# Patient Record
Sex: FEMALE | Race: WHITE | Employment: OTHER | URBAN - METROPOLITAN AREA
[De-identification: names, ages, dates, MRNs, and addresses within clinical notes are randomized per-mention and may not be internally consistent; named-entity substitution may affect disease eponyms.]

---

## 2019-06-19 ENCOUNTER — LAB ENCOUNTER (OUTPATIENT)
Dept: LAB | Facility: REFERENCE LAB | Age: 44
End: 2019-06-19
Attending: FAMILY MEDICINE
Payer: COMMERCIAL

## 2019-06-19 ENCOUNTER — OFFICE VISIT (OUTPATIENT)
Dept: FAMILY MEDICINE CLINIC | Facility: CLINIC | Age: 44
End: 2019-06-19
Payer: COMMERCIAL

## 2019-06-19 VITALS
WEIGHT: 160 LBS | SYSTOLIC BLOOD PRESSURE: 120 MMHG | HEART RATE: 73 BPM | DIASTOLIC BLOOD PRESSURE: 80 MMHG | BODY MASS INDEX: 30.21 KG/M2 | OXYGEN SATURATION: 99 % | HEIGHT: 61 IN

## 2019-06-19 DIAGNOSIS — Z00.00 ENCOUNTER FOR ROUTINE ADULT HEALTH EXAMINATION WITHOUT ABNORMAL FINDINGS: ICD-10-CM

## 2019-06-19 DIAGNOSIS — Z00.00 ENCOUNTER FOR ROUTINE ADULT HEALTH EXAMINATION WITHOUT ABNORMAL FINDINGS: Primary | ICD-10-CM

## 2019-06-19 PROCEDURE — 99386 PREV VISIT NEW AGE 40-64: CPT | Performed by: FAMILY MEDICINE

## 2019-06-19 PROCEDURE — 80053 COMPREHEN METABOLIC PANEL: CPT

## 2019-06-19 PROCEDURE — 85025 COMPLETE CBC W/AUTO DIFF WBC: CPT

## 2019-06-19 PROCEDURE — 80061 LIPID PANEL: CPT

## 2019-06-19 PROCEDURE — 83036 HEMOGLOBIN GLYCOSYLATED A1C: CPT

## 2019-06-19 PROCEDURE — 36415 COLL VENOUS BLD VENIPUNCTURE: CPT

## 2019-06-19 NOTE — PATIENT INSTRUCTIONS
Prevention Guidelines, Women Ages 36 to 52  Screening tests and vaccines are an important part of managing your health. A screening test is done to find possible disorders or diseases in people who don't have any symptoms.  The goal is to find a disease e this age group At routine exams   Gonorrhea Sexually active women at increased risk for infection At routine exams   Hepatitis C Anyone at increased risk; 1 time for those born between [facility] At routine exams   High cholesterol or triglycerides All Pneumococcal conjugate vaccine (PCV13) and pneumococcal polysaccharide vaccine (PPSV23) Women at increased risk for infection–talk with your healthcare provider 1 or 2 doses   Tetanus/diphtheria/pertussis (Td/Tdap) booster All women in this age group A one

## 2019-06-19 NOTE — PROGRESS NOTES
HPI:   José Manuel Cuevas is a 38year old female who presents for a complete physical exam.     From [facility] [place], then moved to [place], then [place], and now living in [place].      Last pap: 12/2018  Last mammogram: Never had before, plans to star SKIN: no rashes, no suspicious lesions  HEENT: atraumatic, normocephalic, throat clear; normal dentition  EYES: PERRLA, EOMI, conjunctiva are clear  NECK: supple, no adenopathy or thyroid masses   BREAST: no dominant or suspicious mass, no nipple dischar Prescriptions      No prescriptions requested or ordered in this encounter       Imaging & Consults:  None    Cristian Courtney DO  6/19/2019  1:18 PM

## 2020-11-19 ENCOUNTER — HOSPITAL ENCOUNTER (OUTPATIENT)
Age: 45
Discharge: HOME | End: 2020-11-19
Payer: COMMERCIAL

## 2020-11-19 PROCEDURE — 90471 IMMUNIZATION ADMIN: CPT | Performed by: EMERGENCY MEDICINE

## 2020-11-19 PROCEDURE — 90686 IIV4 VACC NO PRSV 0.5 ML IM: CPT | Performed by: EMERGENCY MEDICINE

## 2021-07-28 ENCOUNTER — OFFICE VISIT (OUTPATIENT)
Dept: FAMILY MEDICINE CLINIC | Facility: CLINIC | Age: 46
End: 2021-07-28
Payer: COMMERCIAL

## 2021-07-28 VITALS
WEIGHT: 171 LBS | DIASTOLIC BLOOD PRESSURE: 78 MMHG | OXYGEN SATURATION: 99 % | BODY MASS INDEX: 32.28 KG/M2 | HEIGHT: 61 IN | HEART RATE: 68 BPM | SYSTOLIC BLOOD PRESSURE: 118 MMHG

## 2021-07-28 DIAGNOSIS — Z12.4 PAP SMEAR FOR CERVICAL CANCER SCREENING: ICD-10-CM

## 2021-07-28 DIAGNOSIS — R63.5 WEIGHT GAIN: ICD-10-CM

## 2021-07-28 DIAGNOSIS — M72.2 PLANTAR FASCIITIS OF RIGHT FOOT: ICD-10-CM

## 2021-07-28 DIAGNOSIS — Z00.00 ENCOUNTER FOR ROUTINE ADULT HEALTH EXAMINATION WITHOUT ABNORMAL FINDINGS: Primary | ICD-10-CM

## 2021-07-28 PROCEDURE — 3074F SYST BP LT 130 MM HG: CPT | Performed by: FAMILY MEDICINE

## 2021-07-28 PROCEDURE — 3008F BODY MASS INDEX DOCD: CPT | Performed by: FAMILY MEDICINE

## 2021-07-28 PROCEDURE — 99396 PREV VISIT EST AGE 40-64: CPT | Performed by: FAMILY MEDICINE

## 2021-07-28 PROCEDURE — 87624 HPV HI-RISK TYP POOLED RSLT: CPT | Performed by: FAMILY MEDICINE

## 2021-07-28 PROCEDURE — 3078F DIAST BP <80 MM HG: CPT | Performed by: FAMILY MEDICINE

## 2021-07-28 RX ORDER — CHOLECALCIFEROL (VITAMIN D3) 125 MCG
2000 CAPSULE ORAL DAILY
COMMUNITY

## 2021-07-28 NOTE — PROGRESS NOTES
HPI:   Prashant Santoro is a 51year old female who presents for a complete physical exam.     Last pap: 12/2018 and normal  Last mammogram: Never had one and plans to start at age 47   Menses: Having shorter, heavier cycles but still monthly.  Also having tiffany Children: 2 sons (6 and 9)         EXAM:   Wt Readings from Last 6 Encounters:  07/28/21 : 171 lb (77.6 kg)  06/19/19 : 160 lb (72.6 kg)    Body mass index is 32.31 kg/m².    /78   Pulse 68   Ht 5' 1\" (1.549 m)   Wt 171 lb (77.6 kg)   LMP 06/30/2021 following:  -Monthly breast self-exam  -Breast cancer screening/mammograms and clinical breast exams  -Cervical cancer screening/pap smears  -Colon cancer screening/colonoscopy-declines  -Adequate calcium and Vitamin D intake to prevent osteoporosis  -Heal

## 2021-07-28 NOTE — PATIENT INSTRUCTIONS
-Look into the [place] sock or night splint on [employer] for plantar fasciitis and wear it nightly  -Also try calf stretches and shoe inserts/supportive shoes as well as ice massage   Prevention Guidelines, Women Ages 36 to 52  Screening tests and vaccines every 5 years   Colorectal cancer Women age 39 years and older at average risk Multiple tests are available and are used at different times.  Possible tests include:  · Flexible sigmoidoscopy every 5 years, or  · Colonoscopy every 10 years, or  · CT colonog Vaccine Who needs it How often   Chickenpox (varicella) All women in this age group who have no record of this infection or vaccine 2 doses; the second dose should be given at least 4 weeks after the first dose   Hepatitis A Women at increased risk for inf effects it can cause All women in this age group Every exam   1 American Diabetes Association  2 American College of Obstetricians and Gynecologists   3 [address]  [address] [facility]  [facility] last reviewed this educational c custom-fitted shoe inserts may be helpful. Inserts made of silicone seem to be the most effective.  Custom-made inserts can be provided by foot specialist, physical therapist, or orthopedist.  · Premade or custom-made night splints keep the heel stretched o

## 2021-07-29 LAB — HPV I/H RISK 1 DNA SPEC QL NAA+PROBE: NEGATIVE

## 2021-07-30 ENCOUNTER — LAB ENCOUNTER (OUTPATIENT)
Dept: LAB | Facility: REFERENCE LAB | Age: 46
End: 2021-07-30
Attending: FAMILY MEDICINE
Payer: COMMERCIAL

## 2021-07-30 DIAGNOSIS — Z00.00 ENCOUNTER FOR ROUTINE ADULT HEALTH EXAMINATION WITHOUT ABNORMAL FINDINGS: ICD-10-CM

## 2021-07-30 DIAGNOSIS — R63.5 WEIGHT GAIN: ICD-10-CM

## 2021-07-30 LAB
ALBUMIN SERPL-MCNC: 3.5 G/DL (ref 3.4–5)
ALBUMIN/GLOB SERPL: 1 {RATIO} (ref 1–2)
ALP LIVER SERPL-CCNC: 85 U/L
ALT SERPL-CCNC: 18 U/L
ANION GAP SERPL CALC-SCNC: 6 MMOL/L (ref 0–18)
AST SERPL-CCNC: 12 U/L (ref 15–37)
BASOPHILS # BLD AUTO: 0.04 X10(3) UL (ref 0–0.2)
BASOPHILS NFR BLD AUTO: 0.6 %
BILIRUB SERPL-MCNC: 0.5 MG/DL (ref 0.1–2)
BUN BLD-MCNC: 11 MG/DL (ref 7–18)
BUN/CREAT SERPL: 15.7 (ref 10–20)
CALCIUM BLD-MCNC: 8.5 MG/DL (ref 8.5–10.1)
CHLORIDE SERPL-SCNC: 110 MMOL/L (ref 98–112)
CHOLEST SMN-MCNC: 174 MG/DL (ref ?–200)
CO2 SERPL-SCNC: 23 MMOL/L (ref 21–32)
CREAT BLD-MCNC: 0.7 MG/DL
DEPRECATED RDW RBC AUTO: 42.1 FL (ref 35.1–46.3)
EOSINOPHIL # BLD AUTO: 0.16 X10(3) UL (ref 0–0.7)
EOSINOPHIL NFR BLD AUTO: 2.4 %
ERYTHROCYTE [DISTWIDTH] IN BLOOD BY AUTOMATED COUNT: 12.2 % (ref 11–15)
EST. AVERAGE GLUCOSE BLD GHB EST-MCNC: 114 MG/DL (ref 68–126)
GLOBULIN PLAS-MCNC: 3.6 G/DL (ref 2.8–4.4)
GLUCOSE BLD-MCNC: 92 MG/DL (ref 70–99)
HBA1C MFR BLD HPLC: 5.6 % (ref ?–5.7)
HCT VFR BLD AUTO: 40.8 %
HCV AB SERPL QL IA: NONREACTIVE
HDLC SERPL-MCNC: 65 MG/DL (ref 40–59)
HGB BLD-MCNC: 13.2 G/DL
IMM GRANULOCYTES # BLD AUTO: 0.02 X10(3) UL (ref 0–1)
IMM GRANULOCYTES NFR BLD: 0.3 %
LDLC SERPL CALC-MCNC: 95 MG/DL (ref ?–100)
LYMPHOCYTES # BLD AUTO: 1.53 X10(3) UL (ref 1–4)
LYMPHOCYTES NFR BLD AUTO: 23.2 %
M PROTEIN MFR SERPL ELPH: 7.1 G/DL (ref 6.4–8.2)
MCH RBC QN AUTO: 30.2 PG (ref 26–34)
MCHC RBC AUTO-ENTMCNC: 32.4 G/DL (ref 31–37)
MCV RBC AUTO: 93.4 FL
MONOCYTES # BLD AUTO: 0.58 X10(3) UL (ref 0.1–1)
MONOCYTES NFR BLD AUTO: 8.8 %
NEUTROPHILS # BLD AUTO: 4.26 X10 (3) UL (ref 1.5–7.7)
NEUTROPHILS # BLD AUTO: 4.26 X10(3) UL (ref 1.5–7.7)
NEUTROPHILS NFR BLD AUTO: 64.7 %
NONHDLC SERPL-MCNC: 109 MG/DL (ref ?–130)
OSMOLALITY SERPL CALC.SUM OF ELEC: 287 MOSM/KG (ref 275–295)
PATIENT FASTING Y/N/NP: NO
PATIENT FASTING Y/N/NP: NO
PLATELET # BLD AUTO: 201 10(3)UL (ref 150–450)
POTASSIUM SERPL-SCNC: 4.1 MMOL/L (ref 3.5–5.1)
RBC # BLD AUTO: 4.37 X10(6)UL
SODIUM SERPL-SCNC: 139 MMOL/L (ref 136–145)
TRIGL SERPL-MCNC: 72 MG/DL (ref 30–149)
TSI SER-ACNC: 0.71 MIU/ML (ref 0.36–3.74)
VLDLC SERPL CALC-MCNC: 12 MG/DL (ref 0–30)
WBC # BLD AUTO: 6.6 X10(3) UL (ref 4–11)

## 2021-07-30 PROCEDURE — 36415 COLL VENOUS BLD VENIPUNCTURE: CPT

## 2021-07-30 PROCEDURE — 86803 HEPATITIS C AB TEST: CPT

## 2021-07-30 PROCEDURE — 85025 COMPLETE CBC W/AUTO DIFF WBC: CPT

## 2021-07-30 PROCEDURE — 80053 COMPREHEN METABOLIC PANEL: CPT

## 2021-07-30 PROCEDURE — 83036 HEMOGLOBIN GLYCOSYLATED A1C: CPT

## 2021-07-30 PROCEDURE — 80061 LIPID PANEL: CPT

## 2021-07-30 PROCEDURE — 84443 ASSAY THYROID STIM HORMONE: CPT

## 2021-08-03 ENCOUNTER — MED REC SCAN ONLY (OUTPATIENT)
Dept: FAMILY MEDICINE CLINIC | Facility: CLINIC | Age: 46
End: 2021-08-03

## 2021-08-03 LAB
LAST PAP RESULT: NORMAL
PAP HISTORY (OTHER THAN LAST PAP): NORMAL

## 2021-10-19 ENCOUNTER — HOSPITAL ENCOUNTER (OUTPATIENT)
Age: 46
Discharge: HOME | End: 2021-10-19
Payer: COMMERCIAL

## 2021-10-19 VITALS
RESPIRATION RATE: 18 BRPM | OXYGEN SATURATION: 99 % | TEMPERATURE: 98 F | SYSTOLIC BLOOD PRESSURE: 133 MMHG | DIASTOLIC BLOOD PRESSURE: 72 MMHG | HEART RATE: 67 BPM

## 2021-10-19 DIAGNOSIS — Z20.822 ENCOUNTER FOR LABORATORY TESTING FOR COVID-19 VIRUS: Primary | ICD-10-CM

## 2021-10-19 DIAGNOSIS — T50.Z95A ADVERSE EFFECT OF VACCINE, INITIAL ENCOUNTER: ICD-10-CM

## 2021-10-19 PROCEDURE — 99213 OFFICE O/P EST LOW 20 MIN: CPT | Performed by: NURSE PRACTITIONER

## 2021-10-19 PROCEDURE — U0002 COVID-19 LAB TEST NON-CDC: HCPCS | Performed by: NURSE PRACTITIONER

## 2021-10-19 NOTE — ED PROVIDER NOTES
Patient Seen in: [facility]      History   Patient presents with:  Testing    Stated Complaint: Covid test    Subjective:   Jason Hernadez is a 51year-old female who is here for COVID testing.  Patient received her COVID booster over the week None (Room air)       Current:/72   Pulse 67   Temp 98.2 °F (36.8 °C)   Resp 18   LMP 06/30/2021   SpO2 99%         Physical Exam  Vitals and nursing note reviewed. Constitutional:       General: She is not in acute distress.      Appearance: Normal COVID negative. Likely vaccine response. Discharge home. Supportive care. Patient is afebrile, nontoxic in appearance without signs of clinical deterioration.  Patient has no evidence of any emergent medical condition at this time which would warrant

## 2021-10-19 NOTE — ED INITIAL ASSESSMENT (HPI)
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

## 2021-11-12 ENCOUNTER — HOSPITAL ENCOUNTER (OUTPATIENT)
Age: 46
Discharge: HOME | End: 2021-11-12
Payer: COMMERCIAL

## 2021-11-12 VITALS
TEMPERATURE: 98 F | HEART RATE: 80 BPM | DIASTOLIC BLOOD PRESSURE: 75 MMHG | OXYGEN SATURATION: 99 % | RESPIRATION RATE: 18 BRPM | SYSTOLIC BLOOD PRESSURE: 107 MMHG

## 2021-11-12 DIAGNOSIS — Z20.822 ENCOUNTER FOR LABORATORY TESTING FOR COVID-19 VIRUS: ICD-10-CM

## 2021-11-12 DIAGNOSIS — Z20.822 LAB TEST NEGATIVE FOR COVID-19 VIRUS: ICD-10-CM

## 2021-11-12 DIAGNOSIS — B34.9 VIRAL ILLNESS: Primary | ICD-10-CM

## 2021-11-12 PROCEDURE — 87880 STREP A ASSAY W/OPTIC: CPT | Performed by: NURSE PRACTITIONER

## 2021-11-12 PROCEDURE — U0002 COVID-19 LAB TEST NON-CDC: HCPCS | Performed by: NURSE PRACTITIONER

## 2021-11-12 PROCEDURE — 99213 OFFICE O/P EST LOW 20 MIN: CPT | Performed by: NURSE PRACTITIONER

## 2021-11-12 NOTE — ED INITIAL ASSESSMENT (HPI)
Pt came in due to cough and congestion for the past 2 days. Pt stated she needs a covid test before she can go back to work. Pt denies any sob, cp, nvd, ha, fever, or any dizziness. Pt has easy non labored respirations. Pt is fully verbal and ambulatory.

## 2021-11-12 NOTE — ED PROVIDER NOTES
I  Patient Seen in: [facility]      History   Patient presents with:  Cough/URI    Stated Complaint: Covid test, Cough/URI    Subjective:   Well-appearing 76-year-old female presents for COVID-19 testing.   Patient communicates that for the pas present. Mouth/Throat:      Lips: Pink. Mouth: Mucous membranes are moist.      Pharynx: Uvula midline. Posterior oropharyngeal erythema present. No pharyngeal swelling, oropharyngeal exudate or uvula swelling.       Tonsils: No tonsillar exudate

## 2022-07-20 ENCOUNTER — TELEPHONE (OUTPATIENT)
Dept: FAMILY MEDICINE CLINIC | Facility: CLINIC | Age: 47
End: 2022-07-20

## 2022-07-20 NOTE — TELEPHONE ENCOUNTER
Patient has an appointment for her annual physical tomorrow but she has a guest staying at her house that is currently positive for Covid. She denies symptoms and her Covid test was negative. She is also vaccinated. Her guest is currently quarantined. She would like to know if she should reschedule. Please advise.      Future Appointments   Date Time Provider Abran Yen   7/21/2022  2:00 PM Chirag Rodriguez,  [facility]

## 2022-07-21 ENCOUNTER — OFFICE VISIT (OUTPATIENT)
Dept: FAMILY MEDICINE CLINIC | Facility: CLINIC | Age: 47
End: 2022-07-21
Payer: COMMERCIAL

## 2022-07-21 VITALS
HEIGHT: 61 IN | DIASTOLIC BLOOD PRESSURE: 74 MMHG | OXYGEN SATURATION: 98 % | BODY MASS INDEX: 31.68 KG/M2 | WEIGHT: 167.81 LBS | HEART RATE: 91 BPM | SYSTOLIC BLOOD PRESSURE: 126 MMHG

## 2022-07-21 DIAGNOSIS — L30.4 INTERTRIGO: ICD-10-CM

## 2022-07-21 DIAGNOSIS — Z00.00 ROUTINE GENERAL MEDICAL EXAMINATION AT A HEALTH CARE FACILITY: Primary | ICD-10-CM

## 2022-07-21 PROCEDURE — 99396 PREV VISIT EST AGE 40-64: CPT | Performed by: FAMILY MEDICINE

## 2022-07-21 PROCEDURE — 3078F DIAST BP <80 MM HG: CPT | Performed by: FAMILY MEDICINE

## 2022-07-21 PROCEDURE — 3008F BODY MASS INDEX DOCD: CPT | Performed by: FAMILY MEDICINE

## 2022-07-21 PROCEDURE — 3074F SYST BP LT 130 MM HG: CPT | Performed by: FAMILY MEDICINE

## 2022-07-21 RX ORDER — CLOTRIMAZOLE AND BETAMETHASONE DIPROPIONATE 10; .64 MG/G; MG/G
1 CREAM TOPICAL 2 TIMES DAILY
Qty: 60 G | Refills: 1 | Status: SHIPPED | OUTPATIENT
Start: 2022-07-21

## 2024-03-14 ENCOUNTER — NURSE TRIAGE (OUTPATIENT)
Facility: CLINIC | Age: 49
End: 2024-03-14

## 2024-03-14 NOTE — TELEPHONE ENCOUNTER
Please reply to pool: EM RN TRIAGE  Action Requested: Summary for Provider     []  Critical Lab, Recommendations Needed  [] Need Additional Advice  [x]   FYI    []   Need Orders  [] Need Medications Sent to Pharmacy  []  Other     SUMMARY: Patient contacts clinic reporting new onset of \"migraine\" headaches.  Pain is debilitating when it occurs.  She believes this may be related to menopausal symptoms.  Denies vision loss or blurred vision.  Face felt numb once, not present now.  Denies dizziness, lightheadedness or unilateral weakness.  Denies fever, body aches or chills.  Acute visit offered today, patient is unable to come in.  Acute visit scheduled 03/19, to report any progression of symptoms prior.  She verbalized understanding and compliance.    Reason for call: Headache  Onset: Data Unavailable                       Reason for Disposition   Unexplained headache that is present > 24 hours    Protocols used: Headache-A-OH

## 2024-03-19 ENCOUNTER — OFFICE VISIT (OUTPATIENT)
Facility: CLINIC | Age: 49
End: 2024-03-19
Payer: COMMERCIAL

## 2024-03-19 VITALS
SYSTOLIC BLOOD PRESSURE: 116 MMHG | BODY MASS INDEX: 32.47 KG/M2 | OXYGEN SATURATION: 97 % | WEIGHT: 172 LBS | DIASTOLIC BLOOD PRESSURE: 70 MMHG | HEIGHT: 61 IN | HEART RATE: 88 BPM

## 2024-03-19 DIAGNOSIS — G43.109 MIGRAINE WITH AURA AND WITHOUT STATUS MIGRAINOSUS, NOT INTRACTABLE: ICD-10-CM

## 2024-03-19 DIAGNOSIS — N95.1 PERIMENOPAUSAL SYMPTOMS: Primary | ICD-10-CM

## 2024-03-19 DIAGNOSIS — Z12.31 SCREENING MAMMOGRAM, ENCOUNTER FOR: ICD-10-CM

## 2024-03-19 PROCEDURE — 99214 OFFICE O/P EST MOD 30 MIN: CPT | Performed by: FAMILY MEDICINE

## 2024-03-19 PROCEDURE — 3078F DIAST BP <80 MM HG: CPT | Performed by: FAMILY MEDICINE

## 2024-03-19 PROCEDURE — 3074F SYST BP LT 130 MM HG: CPT | Performed by: FAMILY MEDICINE

## 2024-03-19 PROCEDURE — 3008F BODY MASS INDEX DOCD: CPT | Performed by: FAMILY MEDICINE

## 2024-03-19 NOTE — PROGRESS NOTES
CC:    Chief Complaint   Patient presents with    Migraine    Menopause     Having lots of menopausal symptoms.       HPI: 48 year old female here to discuss migraines and menopausal symptoms.  Developed headaches over the past year, and feels they occur around the time of her menstrual cycles.  Her menstrual cycles are also more irregular over the past year, and feels very hormonal and foggy due to this.  The headaches are getting worse, and she can not even function at times.  She denies any history of migraines, but they do run in her family.  The headaches feel like a pressure and are associated with light sensitivity.  Has also felt tingling on the left side of her face before it begins.  The tingling has occurred the last two times before the headache begins.  Sleep usually helps resolve the headaches. Has not been taking medication for them.   Getting these headaches at least once a month, but had 2-3 over the last month.   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  Has also had a few hot flashes.  Feels vaginal dryness as well.   Her menstrual cycles have been heavier for days at a time.   Her sisters have a history of endometriosis, but she has not had this diagnosis.   She has been waking up in the night more, but she is still sleeping.  She feels tired all the time.  Also waking up to urinate in the night, which is new.   The headaches are not waking her up from sleep.     ROS:  General:  No fever, fatigue, no weight changes, hot flashes   HEENT:  Denies congestion or nasal discharge  Cardio:  No chest pain  Pulmonary:  No cough, no SOB  GI:  No N/V/D  :  No discharge, no dysuria, no polyuria, no hematuria, irregular menstrual cycles and heavier at times   Dermatologic:  No rashes  Neuro: migraines with aura, intermittent dizziness   Psych: brain fog, increased anxiety and depression     History reviewed. No pertinent past medical history.    Social History      Socioeconomic History    Marital status:      Spouse name: Not on file    Number of children: Not on file    Years of education: Not on file    Highest education level: Not on file   Occupational History    Not on file   Tobacco Use    Smoking status: Never    Smokeless tobacco: Never   Vaping Use    Vaping Use: Never used   Substance and Sexual Activity    Alcohol use: Yes     Alcohol/week: 3.0 standard drinks of alcohol     Types: 3 Glasses of wine per week     Comment: occ    Drug use: Never    Sexual activity: Yes     Partners: Female   Other Topics Concern    Caffeine Concern No    Exercise No    Seat Belt No    Special Diet No    Stress Concern No    Weight Concern No   Social History Narrative    Not on file     Social Determinants of Health     Financial Resource Strain: Not on file   Food Insecurity: Not on file   Transportation Needs: Not on file   Physical Activity: Not on file   Stress: Not on file   Social Connections: Not on file   Housing Stability: Not on file       No current outpatient medications on file.       Patient has no known allergies.      Vitals:   Vitals:    03/19/24 1028   BP: 116/70   Pulse: 88   SpO2: 97%   Weight: 172 lb (78 kg)   Height: 5' 1\" (1.549 m)       Body mass index is 32.5 kg/m².    Physical:  General:  Alert, appropriate, no acute distress   HEENT: supple, no tonsillar erythema or exudate, no lymphadenopathy   Cardio:  RRR, no murmurs, S1, S2  Pulmonary:  Clear bilaterally, good air entry  Dermatologic:  No rashes or lesions  Neuro: CN II-XII intact, 5/5 muscle strength bilateral upper and lower extremities, normal cerebellar testing, no focal neurologic deficits       Assessment and Plan: 48 year old female here to discuss severe perimenopausal symptoms and new migraines with aura.    1. Perimenopausal symptoms    - Given severity of perimenopausal symptoms greatly impacting her quality of life patient would like to start hormone replacement therapy  - She  agreed to complete a baseline mammogram prior to starting, and if normal okay to start as she is low risk for complications of estrogen and progesterone therapy  - Plan to start estradiol patches at lowest dose twice weekly with oral progesterone, but will consider Mirena IUD if menorrhagia increases or cycles become closer together  - Explained all potential risks associated with HRT, and she expressed understanding and acceptance of risks    2. Migraine with aura and without status migrainosus, not intractable    - Normal neurologic exam today, but given new aura recommend MRI of the brain to rule-out intracranial pathology  - Should also improve with addition of HRT, and given low dose estrogen used for HRT no contraindications to starting even with aura   - Consider prophylactic medication if not improving or worsening, and can continue prn Excedrin or NSAID's for treatment   - MRI BRAIN (CPT=70551); Future    3. Screening mammogram, encounter for    - Baseline mammogram ordered  - [facility] AMADA 2D+3D SCREENING BILAT (CPT=77067/86742); Future      June Morataya DO  03/19/24  10:41 AM

## 2024-04-05 ENCOUNTER — HOSPITAL ENCOUNTER (OUTPATIENT)
Dept: MAMMOGRAPHY | Age: 49
Discharge: HOME | End: 2024-04-05
Attending: FAMILY MEDICINE
Payer: COMMERCIAL

## 2024-04-05 DIAGNOSIS — Z12.31 SCREENING MAMMOGRAM, ENCOUNTER FOR: ICD-10-CM

## 2024-04-05 PROCEDURE — 77067 SCR MAMMO BI INCL CAD: CPT | Performed by: FAMILY MEDICINE

## 2024-04-05 PROCEDURE — 77063 BREAST TOMOSYNTHESIS BI: CPT | Performed by: FAMILY MEDICINE

## 2024-04-10 ENCOUNTER — HOSPITAL ENCOUNTER (OUTPATIENT)
Dept: MAMMOGRAPHY | Facility: HOSPITAL | Age: 49
Discharge: HOME | End: 2024-04-10
Attending: FAMILY MEDICINE
Payer: COMMERCIAL

## 2024-04-10 DIAGNOSIS — R92.8 ABNORMAL MAMMOGRAM: ICD-10-CM

## 2024-04-10 PROCEDURE — 77061 BREAST TOMOSYNTHESIS UNI: CPT | Performed by: FAMILY MEDICINE

## 2024-04-10 PROCEDURE — 77065 DX MAMMO INCL CAD UNI: CPT | Performed by: FAMILY MEDICINE

## 2024-04-11 ENCOUNTER — PATIENT MESSAGE (OUTPATIENT)
Facility: CLINIC | Age: 49
End: 2024-04-11

## 2024-04-12 NOTE — TELEPHONE ENCOUNTER
From: Leslie Biswas  To: June Hood  Sent: 4/11/2024 9:54 PM CDT  Subject: Moving forward.    Thanks for talking me through the options today on the phone.  I spoke with my wife who reminded me that if I am going to start a course of medication it might be better to do so while I am not working over the summer to allow my body to get used to it. She also reminded just how down I have been feeling and doesn’t think I should postpone doing anything.   I also spoke with my Mum who reminded me my gran had osteoporosis and she has osteopenia and HRT is supposed to help guard against that in addition to helping with just how weak I am feeling all the time.    So I am going back to the idea of starting HRT which I presume I need to take the estrogen birth control pill with.     If you would still advise that I meet with the gynecologist first then I am happy to take that recommendation and can schedule an appointment.

## 2024-04-17 RX ORDER — LEVONORGESTREL AND ETHINYL ESTRADIOL 0.1-0.02MG
1 KIT ORAL DAILY
Qty: 90 TABLET | Refills: 1 | Status: SHIPPED | OUTPATIENT
Start: 2024-04-17

## 2024-04-17 NOTE — TELEPHONE ENCOUNTER
Called patient back regarding treatment options. Reports before her menstrual cycle starts she can feel a headache coming on, and the last two times it happened she felt a tingling on the left side of her face with severe light sensitivity, but the migraines are only occurring with her cycles. Her worst symptoms are the inability to focus, anxiety, and depression. She also has hot flashes, but these are not too severe.   Discussed in great detail the recommendation for OCP's versus HRT for treatment of perimenopausal symptoms but with more regular cycles, and decided to try OCP's to prevent increased spotting and bleeding with HRT. Did also discuss in great detail concern for recent migraines with aura and increased risk of stroke with estrogen, but patient would still like to start the OCP's with acceptance and understanding of these risks.  Discussed in great detail risks of combined OCP's, including but not limited to DVT, PE, MI and stroke, and patient expressed understanding and acceptance of these risks. Will schedule physical in August, but notify me sooner if any concerns.

## 2024-08-14 ENCOUNTER — OFFICE VISIT (OUTPATIENT)
Facility: CLINIC | Age: 49
End: 2024-08-14
Payer: COMMERCIAL

## 2024-08-14 VITALS
HEIGHT: 61 IN | OXYGEN SATURATION: 97 % | BODY MASS INDEX: 31.72 KG/M2 | WEIGHT: 168 LBS | SYSTOLIC BLOOD PRESSURE: 126 MMHG | HEART RATE: 75 BPM | DIASTOLIC BLOOD PRESSURE: 74 MMHG

## 2024-08-14 DIAGNOSIS — R23.2 HOT FLASHES: ICD-10-CM

## 2024-08-14 DIAGNOSIS — Z12.11 COLON CANCER SCREENING: ICD-10-CM

## 2024-08-14 DIAGNOSIS — N95.1 PERIMENOPAUSAL SYMPTOMS: ICD-10-CM

## 2024-08-14 DIAGNOSIS — Z00.00 ENCOUNTER FOR ROUTINE ADULT HEALTH EXAMINATION WITHOUT ABNORMAL FINDINGS: Primary | ICD-10-CM

## 2024-08-14 PROCEDURE — 3078F DIAST BP <80 MM HG: CPT | Performed by: FAMILY MEDICINE

## 2024-08-14 PROCEDURE — 3008F BODY MASS INDEX DOCD: CPT | Performed by: FAMILY MEDICINE

## 2024-08-14 PROCEDURE — 99396 PREV VISIT EST AGE 40-64: CPT | Performed by: FAMILY MEDICINE

## 2024-08-14 PROCEDURE — 3074F SYST BP LT 130 MM HG: CPT | Performed by: FAMILY MEDICINE

## 2024-08-14 RX ORDER — LEVONORGESTREL/ETHIN.ESTRADIOL 0.1-0.02MG
1 TABLET ORAL DAILY
Qty: 90 TABLET | Refills: 0 | Status: SHIPPED | OUTPATIENT
Start: 2024-08-14

## 2024-08-14 NOTE — PROGRESS NOTES
HPI:   Leslie Biswas is a 49 year old female who presents for a complete physical exam.     Has been on Lutera for the past four months, and her menstrual cycles are actually heavier since starting it. She has been having a headache at the start of her menstrual cycle every month, but they are not as severe as they were and no longer with an aura.   She is still struggling to stay on task, and she gets very easily distracted as well. Also feels very fatigued despite sleeping a lot. She does have a hard time going to bed at an earlier time, and often has thoughts that keep her awake.     Last pap: 7/2021 and negative  Last mammogram: 4/2024 and negative    Menses: Regular, monthly cycles  Contraception:  OCP's for perimenopausal symptoms    Previous colonoscopy: Never had one before    History of STD's: None  History of intimate partner violence: None  Family hx of breast, ovarian, cervical or colon CA: Sister with low-grade breast cancer   Diet and exercise: She is not craving foods like she used to, and especially not interested in sweets. She does eat fruits and vegetables. Walks the dog for exercise. Drinks a lot of water. Does not drink coffee anymore.   Immunizations:  Tdap: 2018, Shingles: Declines, Covid: Completed 3 doses    REVIEW OF SYSTEMS:   GENERAL: fatigue, improved hot flashes   SKIN: denies any unusual skin lesions  EYES: no vision problems  BREAST: no lumps or masses, no nipple discharge   LUNGS: denies shortness of breath  CARDIOVASCULAR: denies chest pain  GI: denies abdominal pain,  No constipation or diarrhea, no hematochezia  : denies dysuria, vaginal discharge or itching  NEURO: improved headaches  PSYCHE: denies depression or anxiety but increased irritability   MSK: joint pain         Current Outpatient Medications   Medication Sig Dispense Refill    Levonorgestrel-Ethinyl Estrad (LUTERA) 0.1-20 MG-MCG Oral Tab Take 1 tablet by mouth daily. 90 tablet 0     No Known Allergies   History  reviewed. No pertinent past medical history.   History reviewed. No pertinent surgical history.   Family History   Problem Relation Age of Onset    Heart Attack Mother     No Known Problems Father     Breast Cancer Sister         Stage 0    Cancer Maternal Grandfather         Throat    Dementia Paternal Grandmother       Social History:   Social History     Socioeconomic History    Marital status:    Tobacco Use    Smoking status: Never    Smokeless tobacco: Never   Vaping Use    Vaping status: Never Used   Substance and Sexual Activity    Alcohol use: Yes     Alcohol/week: 3.0 standard drinks of alcohol     Types: 3 Glasses of wine per week     Comment: occ    Drug use: Never    Sexual activity: Yes     Partners: Female   Other Topics Concern    Caffeine Concern No    Exercise No    Seat Belt No    Special Diet No    Stress Concern No    Weight Concern No     Occ:  at [employer]. : Yes. Children: 2 sons.       EXAM:     Wt Readings from Last 6 Encounters:   08/14/24 168 lb (76.2 kg)   03/19/24 172 lb (78 kg)   07/21/22 167 lb 12.8 oz (76.1 kg)   07/28/21 171 lb (77.6 kg)   06/19/19 160 lb (72.6 kg)     Body mass index is 31.74 kg/m².   /74   Pulse 75   Ht 5' 1\" (1.549 m)   Wt 168 lb (76.2 kg)   LMP 08/10/2024   SpO2 97%   BMI 31.74 kg/m²     GENERAL: well developed, well nourished, in no apparent distress   SKIN: no rashes, no suspicious lesions  HEENT: atraumatic, normocephalic, throat clear; normal dentition  EYES: PERRLA, EOMI, conjunctiva are clear  NECK: supple, no adenopathy or thyroid masses   BREAST: no dominant or suspicious mass, no nipple discharge  LUNGS: clear to auscultation  CARDIO: RRR without murmur  GI: good bowel sounds, no masses, HSM or tenderness  : declines  EXTREMITIES: no edema    Cholesterol, Total (mg/dL)   Date Value   07/30/2021 174   06/19/2019 162     HDL Cholesterol (mg/dL)   Date Value   07/30/2021 65 (H)   06/19/2019 59     LDL Cholesterol (mg/dL)    Date Value   07/30/2021 95   06/19/2019 85      ASSESSMENT AND PLAN:   Leslie Biswas is a 49 year old female who presents for a complete physical exam.  Encounter Diagnoses   Name Primary?    Encounter for routine adult health examination without abnormal findings Yes    Colon cancer screening     Perimenopausal symptoms     Hot flashes      Orders Placed This Encounter   Procedures    CBC With Differential With Platelet    Comp Metabolic Panel (14)    Hemoglobin A1C    Lipid Panel    TSH W Reflex To Free T4 [E]     Perimenopausal symptoms only very mildly improved since adding combined OCPs.  Recommend taking OCPs continuously and skipping placebo week to prevent heavy menstrual cycles, and to monitor symptoms over the next 3 months.  If symptoms not improving or worsening we will consider alternative OCP or possible hormone replacement therapy, though would expect OCPs to work better given menstrual cycle concerns.  Also suggested seeing gynecology to workup possible fibroids or endometriosis as a contributing factor, and will think about this.  Discussed in great detail risks of combined OCP's, including but not limited to DVT, PE, MI and stroke, and patient expressed understanding and acceptance of these risks.  Advised considering ultrasound or MRI of both breast due to dense breast tissue, but declines for now.  Check TSH with labs due to hot flashes and fatigue.       Discussed with patient the following:  -Monthly breast self-exam  -Breast cancer screening/mammograms and clinical breast exams  -Cervical cancer screening/pap smears  -Colon cancer screening/colonoscopy: Cologuard sent  -Adequate calcium and Vitamin D intake to prevent osteoporosis  -Healthy diet including adequate intake of vegetables and fruits, appropriate portion sizes, minimizing highly concentrated carbohydrate foods  -Exercising 30 minutes a day most days of the week   -Diabetes screening which she desires  -Cholesterol screening which  she desires  -Recommendation for yearly influenza vaccine  -Need for Tdap once as an adult and Td booster every 10 years  -Need for Zoster vaccine for patients >= 50  -STI screening (GC/Chlamydia/HIV): Not indicated  -Hepatitis C screening for all adults between the ages of 18 and 79: Checked and negative       All questions were answered during the visit and the patient verbalizes understanding. She will return in 3-4 months for follow-up one year for next WWE or sooner as needed.    Meds & Refills for this Visit:  Requested Prescriptions     Signed Prescriptions Disp Refills    Levonorgestrel-Ethinyl Estrad (LUTERA) 0.1-20 MG-MCG Oral Tab 90 tablet 0     Sig: Take 1 tablet by mouth daily.       Imaging & Consults:  None    June Morataya DO  8/14/2024  4:35 PM

## 2024-08-26 LAB — AMB EXT COLOGUARD RESULT: NEGATIVE

## 2024-09-04 ENCOUNTER — LAB ENCOUNTER (OUTPATIENT)
Dept: LAB | Facility: REFERENCE LAB | Age: 49
End: 2024-09-04
Attending: FAMILY MEDICINE
Payer: COMMERCIAL

## 2024-09-04 DIAGNOSIS — Z00.00 ENCOUNTER FOR ROUTINE ADULT HEALTH EXAMINATION WITHOUT ABNORMAL FINDINGS: ICD-10-CM

## 2024-09-04 DIAGNOSIS — R23.2 HOT FLASHES: ICD-10-CM

## 2024-09-04 LAB
ALBUMIN SERPL-MCNC: 4.1 G/DL (ref 3.2–4.8)
ALBUMIN/GLOB SERPL: 1.4 {RATIO} (ref 1–2)
ALP LIVER SERPL-CCNC: 69 U/L
ALT SERPL-CCNC: 10 U/L
ANION GAP SERPL CALC-SCNC: 7 MMOL/L (ref 0–18)
AST SERPL-CCNC: 14 U/L (ref ?–34)
BASOPHILS # BLD AUTO: 0.05 X10(3) UL (ref 0–0.2)
BASOPHILS NFR BLD AUTO: 0.8 %
BILIRUB SERPL-MCNC: 0.3 MG/DL (ref 0.3–1.2)
BUN BLD-MCNC: 9 MG/DL (ref 9–23)
BUN/CREAT SERPL: 11.5 (ref 10–20)
CALCIUM BLD-MCNC: 9 MG/DL (ref 8.7–10.4)
CHLORIDE SERPL-SCNC: 109 MMOL/L (ref 98–112)
CHOLEST SERPL-MCNC: 181 MG/DL (ref ?–200)
CO2 SERPL-SCNC: 25 MMOL/L (ref 21–32)
CREAT BLD-MCNC: 0.78 MG/DL
DEPRECATED RDW RBC AUTO: 43.8 FL (ref 35.1–46.3)
EGFRCR SERPLBLD CKD-EPI 2021: 92 ML/MIN/1.73M2 (ref 60–?)
EOSINOPHIL # BLD AUTO: 0.19 X10(3) UL (ref 0–0.7)
EOSINOPHIL NFR BLD AUTO: 2.9 %
ERYTHROCYTE [DISTWIDTH] IN BLOOD BY AUTOMATED COUNT: 13.1 % (ref 11–15)
EST. AVERAGE GLUCOSE BLD GHB EST-MCNC: 114 MG/DL (ref 68–126)
FASTING PATIENT LIPID ANSWER: NO
FASTING STATUS PATIENT QL REPORTED: NO
GLOBULIN PLAS-MCNC: 2.9 G/DL (ref 2–3.5)
GLUCOSE BLD-MCNC: 114 MG/DL (ref 70–99)
HBA1C MFR BLD: 5.6 % (ref ?–5.7)
HCT VFR BLD AUTO: 37.6 %
HDLC SERPL-MCNC: 56 MG/DL (ref 40–59)
HGB BLD-MCNC: 12.9 G/DL
IMM GRANULOCYTES # BLD AUTO: 0.02 X10(3) UL (ref 0–1)
IMM GRANULOCYTES NFR BLD: 0.3 %
LDLC SERPL CALC-MCNC: 107 MG/DL (ref ?–100)
LYMPHOCYTES # BLD AUTO: 1.76 X10(3) UL (ref 1–4)
LYMPHOCYTES NFR BLD AUTO: 27.1 %
MCH RBC QN AUTO: 31.6 PG (ref 26–34)
MCHC RBC AUTO-ENTMCNC: 34.3 G/DL (ref 31–37)
MCV RBC AUTO: 92.2 FL
MONOCYTES # BLD AUTO: 0.58 X10(3) UL (ref 0.1–1)
MONOCYTES NFR BLD AUTO: 8.9 %
NEUTROPHILS # BLD AUTO: 3.9 X10 (3) UL (ref 1.5–7.7)
NEUTROPHILS # BLD AUTO: 3.9 X10(3) UL (ref 1.5–7.7)
NEUTROPHILS NFR BLD AUTO: 60 %
NONHDLC SERPL-MCNC: 125 MG/DL (ref ?–130)
OSMOLALITY SERPL CALC.SUM OF ELEC: 292 MOSM/KG (ref 275–295)
PLATELET # BLD AUTO: 227 10(3)UL (ref 150–450)
POTASSIUM SERPL-SCNC: 4.1 MMOL/L (ref 3.5–5.1)
PROT SERPL-MCNC: 7 G/DL (ref 5.7–8.2)
RBC # BLD AUTO: 4.08 X10(6)UL
SODIUM SERPL-SCNC: 141 MMOL/L (ref 136–145)
TRIGL SERPL-MCNC: 98 MG/DL (ref 30–149)
TSI SER-ACNC: 0.76 MIU/ML (ref 0.55–4.78)
VLDLC SERPL CALC-MCNC: 17 MG/DL (ref 0–30)
WBC # BLD AUTO: 6.5 X10(3) UL (ref 4–11)

## 2024-09-04 PROCEDURE — 80050 GENERAL HEALTH PANEL: CPT | Performed by: FAMILY MEDICINE

## 2024-09-04 PROCEDURE — 80061 LIPID PANEL: CPT | Performed by: FAMILY MEDICINE

## 2024-09-04 PROCEDURE — 83036 HEMOGLOBIN GLYCOSYLATED A1C: CPT | Performed by: FAMILY MEDICINE

## 2024-11-25 RX ORDER — LEVONORGESTREL/ETHIN.ESTRADIOL 0.1-0.02MG
1 TABLET ORAL DAILY
Qty: 90 TABLET | Refills: 1 | Status: SHIPPED | OUTPATIENT
Start: 2024-11-25

## 2025-02-06 RX ORDER — LEVONORGESTREL/ETHIN.ESTRADIOL 0.1-0.02MG
1 TABLET ORAL DAILY
Qty: 90 TABLET | Refills: 1 | OUTPATIENT
Start: 2025-02-06

## 2025-02-07 RX ORDER — LEVONORGESTREL/ETHIN.ESTRADIOL 0.1-0.02MG
1 TABLET ORAL DAILY
Qty: 90 TABLET | Refills: 1 | Status: SHIPPED | OUTPATIENT
Start: 2025-02-07

## 2025-02-07 NOTE — TELEPHONE ENCOUNTER
Spoke with Kizzy at [facility] pharmacy, patient is looking for future refill to be sent to provider for approval

## 2025-02-07 NOTE — TELEPHONE ENCOUNTER
Please review; protocol failed.    Patient's last pap smear was in 7/2021.      Hi Leslie,     Your pap smear was normal and can be repeated in 3 years.     Take care.     Dr. Morataya   Written by June Morataya DO on 8/3/2021 12:35 PM CDT  Seen by patient Leslie Biswas on 9/6/2021  5:45 PM    Requested Prescriptions   Pending Prescriptions Disp Refills    Levonorgestrel-Ethinyl Estrad (LUTERA) 0.1-20 MG-MCG Oral Tab 90 tablet 1     Sig: Take 1 tablet by mouth daily.       Gynecology Medication Protocol Passed - 2/7/2025  1:36 PM        Passed - PASS--PENDING LAST PAP WNL--VIA MANUAL LOOKUP        Passed - Mammogram in past 12 months        Passed - Medication is active on med list        Passed - Physical or Pelvic/Breast in past 12 or next 3 mos--VIA MANUAL LOOKUP         Refused Prescriptions Disp Refills    Levonorgestrel-Ethinyl Estrad (LUTERA) 0.1-20 MG-MCG Oral Tab 90 tablet 1     Sig: Take 1 tablet by mouth daily.       Gynecology Medication Protocol Passed - 2/7/2025  1:36 PM        Passed - PASS--PENDING LAST PAP WNL--VIA MANUAL LOOKUP        Passed - Mammogram in past 12 months        Passed - Medication is active on med list        Passed - Physical or Pelvic/Breast in past 12 or next 3 mos--VIA MANUAL LOOKUP                 Recent Outpatient Visits              5 months ago [facility] for routine adult health [facility] abnormal [facility]    [facility]    10 months ago [facility]imen[facility]    [facility]    2 years ago Routine general medical examination at a health care facility    [facility] [place] [facility]    Office Visit    3 years ago Encounter for routine adult health examination without abnormal findings    [facility]    Office Visit    5 years  ago Encounter for routine adult health examination without abnormal findings    [facility] June Morataya,     Office Visit

## 2025-03-18 ENCOUNTER — OFFICE VISIT (OUTPATIENT)
Facility: CLINIC | Age: 50
End: 2025-03-18
Payer: COMMERCIAL

## 2025-03-18 VITALS
SYSTOLIC BLOOD PRESSURE: 124 MMHG | HEIGHT: 61 IN | HEART RATE: 84 BPM | OXYGEN SATURATION: 98 % | WEIGHT: 176 LBS | BODY MASS INDEX: 33.23 KG/M2 | DIASTOLIC BLOOD PRESSURE: 82 MMHG

## 2025-03-18 DIAGNOSIS — N95.1 PERIMENOPAUSAL SYMPTOMS: Primary | ICD-10-CM

## 2025-03-18 DIAGNOSIS — Z12.31 VISIT FOR SCREENING MAMMOGRAM: ICD-10-CM

## 2025-03-18 PROCEDURE — 3079F DIAST BP 80-89 MM HG: CPT | Performed by: FAMILY MEDICINE

## 2025-03-18 PROCEDURE — 3008F BODY MASS INDEX DOCD: CPT | Performed by: FAMILY MEDICINE

## 2025-03-18 PROCEDURE — 99213 OFFICE O/P EST LOW 20 MIN: CPT | Performed by: FAMILY MEDICINE

## 2025-03-18 PROCEDURE — 3074F SYST BP LT 130 MM HG: CPT | Performed by: FAMILY MEDICINE

## 2025-03-18 RX ORDER — PROGESTERONE 100 MG/1
100 CAPSULE ORAL NIGHTLY
Qty: 90 CAPSULE | Refills: 1 | Status: SHIPPED | OUTPATIENT
Start: 2025-03-18

## 2025-03-18 RX ORDER — ESTRADIOL 0.03 MG/D
1 FILM, EXTENDED RELEASE TRANSDERMAL
Qty: 24 PATCH | Refills: 0 | Status: SHIPPED | OUTPATIENT
Start: 2025-03-20

## 2025-03-18 NOTE — PROGRESS NOTES
CC:    Chief Complaint   Patient presents with    Follow - Up     Wants to start on HRT       HPI: 49 year old female here to follow-up on perimenopausal symptoms.  Had been taking OCP's continuously and skipping the placebo pill, but her pharmacy would not refill her medication for two weeks so she was off of it.  She was extremely tired during that time and had a very heavy period as well.   She resumed the pill again three weeks ago, but still having fatigue.  Reports her headaches have almost fully resolved since being on the birth control pill, and she is better at managing tasks.  She still has significant brain fog, poor sleep, hot flashes, and weight gain.     ROS:  General:  No fever, fatigue, weight gain, hot flashes   HEENT:  Nasal congestion but no nasal discharge  Cardio:  No chest pain  Pulmonary:  No cough, no SOB  GI:  No N/V/D  Dermatologic:  No rashes  Neuro: brain fog, improved headaches     History reviewed. No pertinent past medical history.    Social History     Socioeconomic History    Marital status:      Spouse name: Not on file    Number of children: Not on file    Years of education: Not on file    Highest education level: Not on file   Occupational History    Not on file   Tobacco Use    Smoking status: Never    Smokeless tobacco: Never   Vaping Use    Vaping status: Never Used   Substance and Sexual Activity    Alcohol use: Yes     Alcohol/week: 3.0 standard drinks of alcohol     Types: 3 Glasses of wine per week     Comment: occ    Drug use: Never    Sexual activity: Yes     Partners: Female   Other Topics Concern    Caffeine Concern No    Exercise No    Seat Belt No    Special Diet No    Stress Concern No    Weight Concern No   Social History Narrative    Not on file     Social Drivers of Health     Food Insecurity: No Food Insecurity (3/18/2025)    NCSS - Food Insecurity     Worried About Running Out of Food in the Last Year: No     Ran Out of Food in the Last Year: No    Transportation Needs: No Transportation Needs (3/18/2025)    NCSS - Transportation     Lack of Transportation: No   Stress: Not on file   Housing Stability: Not At Risk (3/18/2025)    NCSS - Housing/Utilities     Has Housing: Yes     Worried About Losing Housing: No     Unable to Get Utilities: No       Current Outpatient Medications   Medication Sig Dispense Refill    progesterone 100 MG Oral Cap Take 1 capsule (100 mg total) by mouth nightly. 90 capsule 1    [START ON 3/20/2025] estradiol (VIVELLE-DOT) 0.025 MG/24HR Transdermal Patch Biweekly Place 1 patch onto the skin twice a week. 24 patch 0       Patient has no known allergies.      Vitals:   Vitals:    03/18/25 0829   BP: 124/82   Pulse: 84   SpO2: 98%   Weight: 176 lb (79.8 kg)   Height: 5' 1\" (1.549 m)       Body mass index is 33.25 kg/m².    Physical:  General:  Alert, appropriate, no acute distress   Psych: normal mood and affect     Assessment and Plan: 49-year-old female here to follow-up on perimenopausal symptoms since starting birth control pills.    1. Perimenopausal symptoms    - Has noticed some improvement since starting oral contraceptive pills, but still having significant brain fog, hot flashes, weight gain, and poor sleep so requesting to switch to hormone replacement therapy  - Discussed recommendation would be to place a Mirena IUD to limit risks of dysfunctional uterine bleeding while still having regular menstrual cycles, but patient declines this option  - She understands risks of dysfunctional uterine bleeding with starting oral progesterone and estradiol patches, and would like to proceed with this therapy  - Will start estradiol patches at 0.025 mg twice weekly and progesterone 100 mg nightly  - Discussed risks associated with hormone replacement therapy, and she expressed understanding and acceptance of these risks  - Notify me in the next 3 months how she is doing or sooner if concerns    2. Visit for screening mammogram    - Due  for screening mammogram in April  - [facility] 2D+3D SCREENING BILAT (CPT=77067/58952); Alma Morataya DO  03/18/25  8:35 AM

## 2025-06-18 RX ORDER — PROGESTERONE 100 MG/1
100 CAPSULE ORAL NIGHTLY
Qty: 90 CAPSULE | Refills: 1 | OUTPATIENT
Start: 2025-06-18

## 2025-06-18 RX ORDER — ESTRADIOL 0.03 MG/D
1 FILM, EXTENDED RELEASE TRANSDERMAL
Qty: 24 PATCH | Refills: 0 | Status: SHIPPED | OUTPATIENT
Start: 2025-06-19

## 2025-06-18 NOTE — TELEPHONE ENCOUNTER
Please review. Protocol Failed; No Protocol    Future Appointments   Date Time Provider Department Center   7/21/2025 10:00 AM [address]   8/18/2025  4:00 PM June Morataya DO [facility]

## 2025-06-18 NOTE — TELEPHONE ENCOUNTER
Outpatient Medication Detail     Disp Refills Start End    progesterone 100 MG Oral Cap 90 capsule 1 3/18/2025 --    Sig - Route: Take 1 capsule (100 mg total) by mouth nightly. - Oral    Sent to pharmacy as: Progesterone 100 MG Oral Capsule (Prometrium)    E-Prescribing Status: Receipt confirmed by pharmacy (3/18/2025  8:46 AM CDT)      Pharmacy    [id number] - [facility] [address] [facility] [place] [phone], [phone]

## 2025-07-21 ENCOUNTER — HOSPITAL ENCOUNTER (OUTPATIENT)
Dept: MAMMOGRAPHY | Age: 50
Discharge: HOME | End: 2025-07-21
Attending: FAMILY MEDICINE
Payer: COMMERCIAL

## 2025-07-21 DIAGNOSIS — Z12.31 VISIT FOR SCREENING MAMMOGRAM: ICD-10-CM

## 2025-07-21 PROCEDURE — 77067 SCR MAMMO BI INCL CAD: CPT | Performed by: FAMILY MEDICINE

## 2025-07-21 PROCEDURE — 77063 BREAST TOMOSYNTHESIS BI: CPT | Performed by: FAMILY MEDICINE

## 2025-08-18 ENCOUNTER — OFFICE VISIT (OUTPATIENT)
Facility: CLINIC | Age: 50
End: 2025-08-18

## 2025-08-18 VITALS
DIASTOLIC BLOOD PRESSURE: 72 MMHG | OXYGEN SATURATION: 98 % | WEIGHT: 168 LBS | SYSTOLIC BLOOD PRESSURE: 118 MMHG | HEART RATE: 70 BPM | HEIGHT: 61 IN | BODY MASS INDEX: 31.72 KG/M2

## 2025-08-18 DIAGNOSIS — N95.1 PERIMENOPAUSAL SYMPTOMS: ICD-10-CM

## 2025-08-18 DIAGNOSIS — R68.89 HEAT INTOLERANCE: ICD-10-CM

## 2025-08-18 DIAGNOSIS — Z00.00 ENCOUNTER FOR ROUTINE ADULT HEALTH EXAMINATION WITHOUT ABNORMAL FINDINGS: Primary | ICD-10-CM

## 2025-08-18 RX ORDER — ESTRADIOL 0.04 MG/D
1 PATCH, EXTENDED RELEASE TRANSDERMAL
Qty: 24 PATCH | Refills: 0 | Status: SHIPPED | OUTPATIENT
Start: 2025-08-18

## 2025-08-18 RX ORDER — PROGESTERONE 100 MG/1
100 CAPSULE ORAL NIGHTLY
Qty: 90 CAPSULE | Refills: 3 | Status: SHIPPED | OUTPATIENT
Start: 2025-08-18